# Patient Record
Sex: FEMALE | Race: WHITE
[De-identification: names, ages, dates, MRNs, and addresses within clinical notes are randomized per-mention and may not be internally consistent; named-entity substitution may affect disease eponyms.]

---

## 2021-09-28 ENCOUNTER — HOSPITAL ENCOUNTER (OUTPATIENT)
Dept: HOSPITAL 75 - RAD | Age: 46
End: 2021-09-28
Attending: OBSTETRICS & GYNECOLOGY
Payer: COMMERCIAL

## 2021-09-28 DIAGNOSIS — Z12.31: Primary | ICD-10-CM

## 2021-09-28 PROCEDURE — 77067 SCR MAMMO BI INCL CAD: CPT

## 2021-09-28 PROCEDURE — 77063 BREAST TOMOSYNTHESIS BI: CPT

## 2021-09-28 NOTE — DIAGNOSTIC IMAGING REPORT
INDICATION: 

Routine screening.



COMPARISON:     

11/08/2019 and 11/09/2018.



TECHNIQUE/COMPARISON: 

2D and 3D bilateral screening mammography was performed with CAD.



FINDINGS:

Scattered fibroglandular densities are identified bilaterally. A

nodular density in the superior right breast on the MLO view at

posterior depth appears slightly more prominent on this study.

Additional views are recommended. The left breast is

unremarkable. No malignant-appearing microcalcifications are

seen. The axillae are unremarkable.



IMPRESSION: 

Right breast density. Additional views are recommended for

further evaluation.



ACR BI-RADS Category 0: Incomplete. (Needs additional imaging

evaluation).

Result letter will be mailed to the patient.

Note: At least 10% of breast cancer is not imaged by mammography.



Dictated by: 



  Dictated on workstation # [id number]

## 2021-10-01 ENCOUNTER — HOSPITAL ENCOUNTER (OUTPATIENT)
Dept: HOSPITAL 75 - RAD | Age: 46
End: 2021-10-01
Attending: OBSTETRICS & GYNECOLOGY
Payer: COMMERCIAL

## 2021-10-01 DIAGNOSIS — R92.2: Primary | ICD-10-CM

## 2021-10-01 PROCEDURE — 76642 ULTRASOUND BREAST LIMITED: CPT

## 2021-10-01 PROCEDURE — 77065 DX MAMMO INCL CAD UNI: CPT

## 2021-10-01 NOTE — DIAGNOSTIC IMAGING REPORT
INDICATION: Right breast density.



COMPARISON: Correlation is made with diagnostic mammogram from

earlier the same day.



EXAMINATION: Sonographic interrogation of the upper outer right

breast was performed. 



FINDINGS: No solid or cystic mass is detected. No sonographic

abnormality is seen.



IMPRESSION: No sonographic abnormality is identified. A follow-up

right mammogram in six months is recommended to confirm stability

of the area of increased density. 



ACR BI-RADS Category 3: Probably benign findings.

Result letter will be mailed to the patient.

Note: At least 10% of breast cancer is not imaged by mammography.



Dictated by: 



  Dictated on workstation # [id number]

## 2021-10-01 NOTE — DIAGNOSTIC IMAGING REPORT
INDICATION: Right breast density. Patient presents for additional

views.



COMPARISON is made with recent screening study from 09/28/2021.



Unilateral right 2-D and 3-D diagnostic mammography was

performed. This included spot compression ML and CC views as well

as conventional 90 degree lateral views.



Additional views show a persistent somewhat ovoid lobulated

density in the upper and slightly outer right breast 13 cm from

the nipple. No suspicious microcalcifications are seen.



IMPRESSION: BI-RADS 0



Persistent density upper outer right breast posterior depth.

Further evaluation with ultrasound is recommended and will be

performed today.



ACR BI-RADS Category 0: Incomplete. (Needs additional imaging

evaluation).

Result letter will be mailed to the patient.

Note: At least 10% of breast cancer is not imaged by mammography.



Dictated by: 



  Dictated on workstation # [id number]

## 2022-04-11 ENCOUNTER — HOSPITAL ENCOUNTER (OUTPATIENT)
Dept: HOSPITAL 75 - RAD | Age: 47
End: 2022-04-11
Attending: OBSTETRICS & GYNECOLOGY
Payer: COMMERCIAL

## 2022-04-11 DIAGNOSIS — R92.2: Primary | ICD-10-CM

## 2022-04-11 PROCEDURE — 77065 DX MAMMO INCL CAD UNI: CPT

## 2022-04-11 NOTE — DIAGNOSTIC IMAGING REPORT
INDICATION: 

Six-month followup right breast density.



COMPARISON: 

Correlation is made to the prior mammogram from 10/01/2021 and

11/08/2019.



TECHNIQUE: 

Unilateral right 2D and 3D diagnostic mammography was performed

with CAD.



FINDINGS:

Scattered fibroglandular densities are noted in the right breast.

The density noted in the upper and outer right breast at

posterior depth appears stable to perhaps slightly less prominent

on today's study. No new mass is seen. No malignant-appearing

microcalcifications are identified.



IMPRESSION: 

Stable right mammogram. An additional 6 month followup is

recommended.



ACR BI-RADS Category 3: Probably benign findings.

Result letter will be mailed to the patient.

Note: At least 10% of breast cancer is not imaged by mammography.



Dictated by: 



  Dictated on workstation # [id number]

## 2022-10-24 ENCOUNTER — HOSPITAL ENCOUNTER (OUTPATIENT)
Dept: HOSPITAL 75 - RAD | Age: 47
End: 2022-10-24
Attending: OBSTETRICS & GYNECOLOGY
Payer: COMMERCIAL

## 2022-10-24 DIAGNOSIS — R92.2: Primary | ICD-10-CM

## 2022-10-24 PROCEDURE — 77066 DX MAMMO INCL CAD BI: CPT

## 2022-10-24 PROCEDURE — 77062 BREAST TOMOSYNTHESIS BI: CPT

## 2022-10-24 PROCEDURE — 76642 ULTRASOUND BREAST LIMITED: CPT

## 2022-10-24 NOTE — DIAGNOSTIC IMAGING REPORT
INDICATION: Left breast density.



Correlation is made with diagnostic mammogram earlier same day.



Sonographic interrogation lower outer left breast was performed.

No sonographic abnormality seen. No solid or cystic masses

identified.



IMPRESSION: No sonographic abnormality is seen. Even so, followup

left mammogram in 6 months is recommended to show continued

stability.



ACR BI-RADS Category 3: Probably benign findings.

Result letter will be mailed to the patient.

Note: At least 10% of breast cancer is not imaged by mammography.



BI-RADS Category 3



Dictated by: 



  Dictated on workstation # [id number]

## 2022-10-24 NOTE — DIAGNOSTIC IMAGING REPORT
INDICATION: Six-month followup right breast density.



Correlation is made with prior mammogram 04/11/2022  and

09/28/2021.



2-D and 3-D bilateral diagnostic mammography was performed with

CAD.



Scattered fibroglandular densities are identified bilaterally.

The lobulated density upper outer right breast posterior depth is

stable. There is a slightly prominent density in the lower outer

left breast mid depth. Additional views of this area was

performed. This is indeterminate. No malignant-appearing

microcalcifications are seen. Axillae are unremarkable.



IMPRESSION: 

1. Stable right mammogram.

2. Slightly prominent density in the lower outer left breast mid

depth approximately 7 cm from the nipple. Additional views were

performed and this did persist. Further evaluation of this area

with ultrasound is recommended and will be performed today.



ACR BI-RADS Category 0: Incomplete. (Needs additional imaging

evaluation).

Result letter will be mailed to the patient.

Note: At least 10% of breast cancer is not imaged by mammography.



BI-RADS 0



Dictated by: 



  Dictated on workstation # [id number]

## 2023-06-06 ENCOUNTER — HOSPITAL ENCOUNTER (OUTPATIENT)
Dept: HOSPITAL 75 - RAD | Age: 48
End: 2023-06-06
Attending: OBSTETRICS & GYNECOLOGY
Payer: COMMERCIAL

## 2023-06-06 DIAGNOSIS — Z53.9: ICD-10-CM

## 2023-06-06 DIAGNOSIS — Z12.31: Primary | ICD-10-CM

## 2023-06-15 ENCOUNTER — HOSPITAL ENCOUNTER (OUTPATIENT)
Dept: HOSPITAL 75 - RAD | Age: 48
End: 2023-06-15
Attending: OBSTETRICS & GYNECOLOGY
Payer: COMMERCIAL

## 2023-06-15 DIAGNOSIS — R92.2: Primary | ICD-10-CM

## 2023-06-15 PROCEDURE — 77065 DX MAMMO INCL CAD UNI: CPT

## 2023-06-15 NOTE — DIAGNOSTIC IMAGING REPORT
Indication: Six-month follow-up left breast density.



Correlation is made with prior exam from 10/24/2022 and

09/20/2021.



Unilateral left 2-D and 3-D diagnostic mammography was performed.



The current study was also evaluated with a Computer Aided

Detection (CAD) system.



The density noted in the lower outer left breast mid depth

appears stable. No new mass is detected. No malignant-appearing

microcalcifications are seen. There are scattered benign

calcifications. Left axilla is unremarkable.



IMPRESSION: BI-RADS 3



Stable left mammogram and left breast density. Additional

six-month follow-up is recommended to show continued stability.



ACR BI-RADS Category 3: Probably benign findings.

Result letter will be mailed to the patient.

Note: At least 10% of breast cancer is not imaged by mammography.



Dictated by: 



  Dictated on workstation # [id number]